# Patient Record
Sex: FEMALE | Race: WHITE | NOT HISPANIC OR LATINO
[De-identification: names, ages, dates, MRNs, and addresses within clinical notes are randomized per-mention and may not be internally consistent; named-entity substitution may affect disease eponyms.]

---

## 2017-06-08 ENCOUNTER — APPOINTMENT (OUTPATIENT)
Dept: OPHTHALMOLOGY | Facility: CLINIC | Age: 82
End: 2017-06-08

## 2020-05-07 ENCOUNTER — TRANSCRIPTION ENCOUNTER (OUTPATIENT)
Age: 85
End: 2020-05-07

## 2020-05-27 ENCOUNTER — TRANSCRIPTION ENCOUNTER (OUTPATIENT)
Age: 85
End: 2020-05-27

## 2021-09-09 ENCOUNTER — NON-APPOINTMENT (OUTPATIENT)
Age: 86
End: 2021-09-09

## 2021-09-09 ENCOUNTER — APPOINTMENT (OUTPATIENT)
Dept: VASCULAR SURGERY | Facility: CLINIC | Age: 86
End: 2021-09-09
Payer: MEDICARE

## 2021-09-09 VITALS — SYSTOLIC BLOOD PRESSURE: 148 MMHG | HEART RATE: 84 BPM | DIASTOLIC BLOOD PRESSURE: 69 MMHG

## 2021-09-09 DIAGNOSIS — E11.9 TYPE 2 DIABETES MELLITUS W/OUT COMPLICATIONS: ICD-10-CM

## 2021-09-09 DIAGNOSIS — Z78.9 OTHER SPECIFIED HEALTH STATUS: ICD-10-CM

## 2021-09-09 DIAGNOSIS — E78.00 PURE HYPERCHOLESTEROLEMIA, UNSPECIFIED: ICD-10-CM

## 2021-09-09 DIAGNOSIS — I10 ESSENTIAL (PRIMARY) HYPERTENSION: ICD-10-CM

## 2021-09-09 PROCEDURE — 99204 OFFICE O/P NEW MOD 45 MIN: CPT

## 2021-09-09 RX ORDER — ERGOCALCIFEROL (VITAMIN D2) 1250 MCG
50000 CAPSULE ORAL
Refills: 0 | Status: ACTIVE | COMMUNITY

## 2021-09-09 RX ORDER — METOPROLOL SUCCINATE 25 MG/1
25 TABLET, EXTENDED RELEASE ORAL
Refills: 0 | Status: ACTIVE | COMMUNITY

## 2021-09-09 RX ORDER — CLOPIDOGREL BISULFATE 75 MG/1
75 TABLET, FILM COATED ORAL
Refills: 0 | Status: ACTIVE | COMMUNITY

## 2021-09-09 RX ORDER — LOSARTAN POTASSIUM 25 MG/1
25 TABLET, FILM COATED ORAL
Refills: 0 | Status: ACTIVE | COMMUNITY

## 2021-09-09 RX ORDER — GLIMEPIRIDE 4 MG/1
TABLET ORAL
Refills: 0 | Status: ACTIVE | COMMUNITY

## 2021-09-09 RX ORDER — METFORMIN HYDROCHLORIDE 500 MG/1
500 TABLET, COATED ORAL
Refills: 0 | Status: ACTIVE | COMMUNITY

## 2021-09-09 RX ORDER — ROSUVASTATIN CALCIUM 10 MG/1
10 TABLET, FILM COATED ORAL
Refills: 0 | Status: ACTIVE | COMMUNITY

## 2021-09-09 RX ORDER — AMLODIPINE BESYLATE 5 MG/1
5 TABLET ORAL
Refills: 0 | Status: ACTIVE | COMMUNITY

## 2021-09-09 NOTE — ASSESSMENT
[FreeTextEntry1] : 86-year-old female with symptoms of chronic mesenteric ischemia.  She does not have signs or symptoms of acute mesenteric ischemia.  Continues to have postprandial abdominal pain over approximately the last 6 weeks.  She is status post mesenteric angiogram at an outside facility, however an intervention could not be performed.  The plan was for repeat angiography, however she now is staying in the [place] area with her daughter.\par I have reviewed the CTA, and I believe it is reasonable to attempt an intervention to the SMA, possibly celiac.  I discussed with the patient and her daughter the risks of this repeat procedure, including the inability to perform intervention.  We also briefly discussed open intervention.\par They wish to proceed with a repeat attempt at endovascular intervention.\par We will schedule mesenteric angiogram, angioplasty, stent in expeditious fashion.  They will obtain medical clearance by the primary care doctor.  If she develops constant abdominal pain, fevers, chills, she will seek emergency care.

## 2021-09-09 NOTE — PHYSICAL EXAM
[Normal Breath Sounds] : Normal breath sounds [2+] : right 2+ [Ankle Swelling Bilaterally] : bilaterally  [No Rash or Lesion] : No rash or lesion [Alert] : alert [Oriented to Person] : oriented to person [Oriented to Place] : oriented to place [Calm] : calm [Ankle Swelling (On Exam)] : not present [Varicose Veins Of Lower Extremities] : not present [] : not present [de-identified] : NAD [de-identified] : NCAT [de-identified] : supple [de-identified] : soft, non tender, non distended; no palpable pulses

## 2021-09-09 NOTE — HISTORY OF PRESENT ILLNESS
[FreeTextEntry1] : 86-year-old female here for evaluation of postprandial abdominal pain.\par She reports that for approximately the past 6 weeks, she has developed pain after eating meals.  Due to the pain, she has minimized her meals, eat small portions.  She developed pain soon after eating, which lasts about half an hour or so.  She reports the prior to 6 weeks she was largely symptom-free, although she could develop some abdominal pain after some meals.  For this finding, she was further evaluated at [facility].  She underwent a CTA of her abdomen and pelvis, and subsequently underwent a mesenteric angiogram by vascular surgeon there.  An intervention could not be performed, and plans was for a repeat attempt via different approach.  However, she has seek care up in [facility] where her daughter resides.  She denies fevers or chills, she denies abdominal pain at rest.

## 2021-09-09 NOTE — DATA REVIEWED
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

## 2021-09-16 ENCOUNTER — RESULT REVIEW (OUTPATIENT)
Age: 86
End: 2021-09-16

## 2021-09-16 ENCOUNTER — APPOINTMENT (OUTPATIENT)
Dept: VASCULAR SURGERY | Facility: HOSPITAL | Age: 86
End: 2021-09-16

## 2021-09-28 ENCOUNTER — APPOINTMENT (OUTPATIENT)
Dept: VASCULAR SURGERY | Facility: CLINIC | Age: 86
End: 2021-09-28
Payer: MEDICARE

## 2021-09-28 DIAGNOSIS — K55.1 CHRONIC VASCULAR DISORDERS OF INTESTINE: ICD-10-CM

## 2021-09-28 PROCEDURE — 99214 OFFICE O/P EST MOD 30 MIN: CPT

## 2021-09-28 PROCEDURE — 99204 OFFICE O/P NEW MOD 45 MIN: CPT

## 2021-09-29 PROBLEM — K55.1 CHRONIC MESENTERIC ISCHEMIA: Status: ACTIVE | Noted: 2021-09-09

## 2021-09-30 NOTE — REASON FOR VISIT
[Initial Evaluation] : an initial evaluation [Family Member] : family member [FreeTextEntry1] : Chronic mesenteric ischemia after unsuccessful mesenteric angioplasties

## 2021-09-30 NOTE — HISTORY OF PRESENT ILLNESS
[FreeTextEntry1] : 86yoF presenting for evaluation of her severe, chronic postprandial abdominal pain, secondary to mesenteric ischemia.  Pt reports pain x ~8wks, forcing her to eat ~6 small meals each day, predominantly ensure and soups.  Ms. Andre was evaluated at [facility] w/CTA a/p, and subsequently underwent a mesenteric angiogram (08/2021) for evidence of mesenteric ischemia, intervention could not successfully be performed, and pt underwent a second unsuccessful mesenteric angioplasty w/Dr. Leslie at [facility].  Pt now presents for another opinion and possible options for treatment.\par \par Ms. Andre has not measured her weight, but is sure that she has lost weight over the past 8wks.  She denies h/o smoking.

## 2021-09-30 NOTE — DATA REVIEWED
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

## 2021-09-30 NOTE — ADDENDUM
[FreeTextEntry1] : This note was written by Otf Garcia, acting as a scribe for Dr. Timi Carter.  I, Dr. Timi Carter, have read and attest that all the information, medical decision-making, and discharge instructions within are true and accurate.\par \par I, Dr. Timi Carter, personally performed the evaluation and management (E/M) services for this new patient.  That E/M includes conducting the initial examination, assessing all conditions, and establishing the plan of care.  Today, my ACP, Otf Garcia, was here to observe my evaluation and management services for this patient to be followed going forward.

## 2021-09-30 NOTE — PHYSICAL EXAM
[Normal Thyroid] : the thyroid was normal [Normal Breath Sounds] : Normal breath sounds [Normal Heart Sounds] : normal heart sounds [Normal Rate and Rhythm] : normal rate and rhythm [2+] : left 2+ [No Rash or Lesion] : No rash or lesion [Alert] : alert [Oriented to Person] : oriented to person [Oriented to Place] : oriented to place [Calm] : calm [JVD] : no jugular venous distention  [Ankle Swelling (On Exam)] : not present [Varicose Veins Of Lower Extremities] : not present [] : not present [Abdomen Masses] : No abdominal masses [Abdomen Tenderness] : ~T ~M No abdominal tenderness [Purpura] : no purpura  [Petechiae] : no petechiae [Skin Ulcer] : no ulcer [Skin Induration] : no induration [de-identified] : NAD, appears weak/fatigued [de-identified] : NC/AT, anicteric [de-identified] : supple [de-identified] : soft, non tender, non distended [de-identified] : FROM throughout, strength 5/5x4

## 2021-09-30 NOTE — ASSESSMENT
[FreeTextEntry1] : 86yoF presenting for evaluation of her severe, chronic postprandial abdominal pain, secondary to mesenteric ischemia.  Pt reports pain x ~8wks, forcing her to eat ~6 small meals each day, predominantly ensure and soups.  Ms. Andre was evaluated at [facility] w/CTA a/p, and subsequently underwent a mesenteric angiogram (08/2021) for evidence of mesenteric ischemia, intervention could not successfully be performed, and pt underwent a second unsuccessful mesenteric angioplasty w/Dr. Leslie at [facility].  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  At this time, symptoms are expected to persist regardless of diet or meal size, and offered pt open surgery to revascularize the SMA via a aortomesenteric bypass.  Pt has an upcoming visit at [facility] for another opinion, and will contact us in the office if she decides to have surgery here.  As per her cardiologist, Dr. Cooley, she is cardiologically optimized for surgery at this time.\par \par I, Dr. Timi Carter, personally performed the evaluation and management (E/M) services for this new patient.  That E/M includes conducting the initial examination, assessing all conditions, and establishing the plan of care.  Today, ACP, Otf Mosquera, was here to observe my evaluation and management services for this patient to be followed going forward.

## 2024-02-23 ENCOUNTER — NON-APPOINTMENT (OUTPATIENT)
Age: 89
End: 2024-02-23

## 2024-02-23 ENCOUNTER — APPOINTMENT (OUTPATIENT)
Dept: OPHTHALMOLOGY | Facility: CLINIC | Age: 89
End: 2024-02-23
Payer: MEDICARE

## 2024-02-23 PROCEDURE — 92004 COMPRE OPH EXAM NEW PT 1/>: CPT

## 2025-02-20 ENCOUNTER — APPOINTMENT (OUTPATIENT)
Dept: OPHTHALMOLOGY | Facility: CLINIC | Age: 89
End: 2025-02-20